# Patient Record
Sex: FEMALE | URBAN - METROPOLITAN AREA
[De-identification: names, ages, dates, MRNs, and addresses within clinical notes are randomized per-mention and may not be internally consistent; named-entity substitution may affect disease eponyms.]

---

## 2018-09-08 ENCOUNTER — RECORDS - HEALTHEAST (OUTPATIENT)
Dept: LAB | Facility: CLINIC | Age: 38
End: 2018-09-08

## 2018-09-10 LAB — N GONORRHOEA DNA SPEC QL NAA+PROBE: NEGATIVE

## 2018-09-12 LAB
GAMMA INTERFERON BACKGROUND BLD IA-ACNC: 0.12 IU/ML
M TB IFN-G BLD-IMP: NEGATIVE
MITOGEN IGNF BCKGRD COR BLD-ACNC: 0.01 IU/ML
MITOGEN IGNF BCKGRD COR BLD-ACNC: 0.01 IU/ML
QTF INTERPRETATION: NORMAL
QTF MITOGEN - NIL: 7.2 IU/ML

## 2019-03-29 ENCOUNTER — RECORDS - HEALTHEAST (OUTPATIENT)
Dept: LAB | Facility: CLINIC | Age: 39
End: 2019-03-29

## 2019-04-01 LAB
GAMMA INTERFERON BACKGROUND BLD IA-ACNC: 0.04 IU/ML
M TB IFN-G BLD-IMP: NEGATIVE
MITOGEN IGNF BCKGRD COR BLD-ACNC: 0.01 IU/ML
MITOGEN IGNF BCKGRD COR BLD-ACNC: 0.05 IU/ML
N GONORRHOEA DNA SPEC QL NAA+PROBE: NEGATIVE
QTF INTERPRETATION: NORMAL
QTF MITOGEN - NIL: 8.44 IU/ML

## 2019-12-11 ENCOUNTER — HOSPITAL ENCOUNTER (EMERGENCY)
Facility: CLINIC | Age: 39
Discharge: HOME | End: 2019-12-11
Attending: EMERGENCY MEDICINE | Admitting: EMERGENCY MEDICINE

## 2019-12-11 ENCOUNTER — APPOINTMENT (OUTPATIENT)
Dept: ULTRASOUND IMAGING | Facility: CLINIC | Age: 39
End: 2019-12-11
Attending: EMERGENCY MEDICINE

## 2019-12-11 VITALS
HEART RATE: 72 BPM | DIASTOLIC BLOOD PRESSURE: 69 MMHG | OXYGEN SATURATION: 100 % | RESPIRATION RATE: 16 BRPM | SYSTOLIC BLOOD PRESSURE: 105 MMHG

## 2019-12-11 DIAGNOSIS — Z32.01 PREGNANCY TEST POSITIVE: ICD-10-CM

## 2019-12-11 DIAGNOSIS — N93.9 VAGINAL BLEEDING: ICD-10-CM

## 2019-12-11 LAB
ABO + RH BLD: NORMAL
ABO + RH BLD: NORMAL
B-HCG SERPL-ACNC: 97 IU/L (ref 0–5)
BLOOD BANK CMNT PATIENT-IMP: NORMAL
BLOOD BANK CMNT PATIENT-IMP: NORMAL
FETAL CELL SCN BLD QL ROSETTE: NORMAL
HGB BLD-MCNC: 10.4 G/DL (ref 11.7–15.7)
RH IG VIALS RECOM PATIENT: NORMAL

## 2019-12-11 PROCEDURE — 76801 OB US < 14 WKS SINGLE FETUS: CPT

## 2019-12-11 PROCEDURE — 86900 BLOOD TYPING SEROLOGIC ABO: CPT | Performed by: EMERGENCY MEDICINE

## 2019-12-11 PROCEDURE — 85461 HEMOGLOBIN FETAL: CPT | Performed by: EMERGENCY MEDICINE

## 2019-12-11 PROCEDURE — 84702 CHORIONIC GONADOTROPIN TEST: CPT | Performed by: EMERGENCY MEDICINE

## 2019-12-11 PROCEDURE — 99284 EMERGENCY DEPT VISIT MOD MDM: CPT | Mod: 25

## 2019-12-11 PROCEDURE — 85018 HEMOGLOBIN: CPT | Performed by: EMERGENCY MEDICINE

## 2019-12-11 PROCEDURE — 86901 BLOOD TYPING SEROLOGIC RH(D): CPT | Performed by: EMERGENCY MEDICINE

## 2019-12-11 ASSESSMENT — ENCOUNTER SYMPTOMS: ABDOMINAL PAIN: 0

## 2019-12-11 NOTE — LETTER
December 11, 2019      To Whom It May Concern:      Claudine Smith was seen in our Emergency Department today, 12/11/19.  I expect her condition to improve over the next 1-2 days.  She may return to work when improved.    Sincerely,        Sugey Alanis RN

## 2019-12-11 NOTE — ED TRIAGE NOTES
Patient was sent here from the clinic.  Patient is pregnant, LMP 10/1, and has been experiencing vaginal bleeding from 12/5 - 12/8.  US done and no visualization of anything in uterus.  Patient sent here to r/o ectopic pregnancy and has a history of ectopic pregnancy.

## 2019-12-11 NOTE — ED AVS SNAPSHOT
[facility] Emergency Department  [address]  [facility] [address]  Phone:  [phone]  Fax:  [phone]                                    Claudine Smith   MRN: [id number]    Department:  [facility] Emergency Department   Date of Visit:  12/11/2019           After Visit Summary Signature Page    I have received my discharge instructions, and my questions have been answered. I have discussed any challenges I see with this plan with the nurse or doctor.    ..........................................................................................................................................  Patient/Patient Representative Signature      ..........................................................................................................................................  Patient Representative Print Name and Relationship to Patient    ..................................................               ................................................  Date                                   Time    ..........................................................................................................................................  Reviewed by Signature/Title    ...................................................              ..............................................  Date                                               Time          22EPIC Rev 08/18

## 2019-12-11 NOTE — ED PROVIDER NOTES
History     Chief Complaint:  Vaginal Bleeding    HPI     A phone  was used obtain the below history as well as to explain diagnosis, plan of treatment, reasons to return to the emergency department and appropriate follow up.    Claudine Smith is a 39 year old  currently pregnant female with a history of ectopic pregnancy who presents with vaginal bleeding. The patient reported that her LMP was in early October which would make the patient approximately 9 weeks pregnant. The patient reported that 7 days ago she had 2 days of heavy vaginal bleeding that she thought might have been her period. Yesterday the patient went to a clinic and took a pregnancy test that was positive. Today she presented to clinic again for ultrasound however they were unable to visualize anything in her uterus so they recommended presentation to the ED for further work up today. The patient's ectopic pregnancy required surgery and the removal of the fallopian tube however she is not sure which one. She denied any abdominal pain, active vaginal bleeding or vaginal discharge.     Allergies:  The patient has no known drug allergies.    Medications:    The patient is currently on no regular medications.     Past Medical History:    The patient denies any significant past medical history.    Past Surgical History:    Fallopian tube removed secondary to to ectopic pregnancy     Family History:    No past pertinent family history.    Social History:  Negative for tobacco use.  Negative for alcohol use.  Negative for drug use.  Marital Status:   [2]     Review of Systems   Gastrointestinal: Negative for abdominal pain.   Genitourinary: Negative for vaginal bleeding and vaginal discharge.   All other systems reviewed and are negative.      Physical Exam     Patient Vitals for the past 24 hrs:   BP Pulse Heart Rate Resp SpO2   19 1730 105/69 72 -- -- 100 %   19 1700 107/74 71 -- -- 100 %   19 1630  109/71 69 -- -- 100 %   19 1616 126/82 -- 89 16 100 %       Physical Exam    Constitutional:  Pleasant, age appropriate.       Resting comfortably in the bed.  HEENT:    Oropharynx is moist  Eyes:    Conjunctiva normal  Neck:    Supple, no meningismus.     CV:     Regular rate and rhythm.      No murmurs, rubs or gallops.     No lower extremity edema.  PULM:    Clear to auscultation bilateral.       No respiratory distress.      Good air exchange.  ABD:    Soft, non-distended.       No abdominal tenderness.     Uterine fundus not palpable     Bowel sounds normal.     No rebound, guarding or rigidity.     No CVA tenderness.   MSK:     No gross deformity to all four extremities.   LYMPH:   No cervical lymphadenopathy.  NEURO:   Alert.  Good muscular tone, no atrophy.   Skin:    Warm, dry and intact.    Psych:    Mood is good and affect is appropriate.      Emergency Department Course   Imaging:  Radiographic findings were communicated with the patient who voiced understanding of the findings.    US OB, <14 weeks w Transvaginal:  No convincing evidence of intrauterine products of  conception. Differential includes spontaneous  and normal  pregnancy with incorrect dates. Ectopic pregnancy is not excluded.  Followup as clinically indicated. No evidence for hemorrhage, as per radiology.     Laboratory:  HC (H)  Hemoglobin: 10.4 (L)  Rh Immune Globin: B Positive      Emergency Department Course:  Nursing notes and vitals reviewed. (1610) I performed an exam of the patient as documented above.     IV inserted. Blood drawn. This was sent to the lab for further testing, results above.    The patient was sent for a OB US while in the emergency department, findings above.     (1831) I rechecked the patient and discussed the results of her workup thus far.     Findings and plan explained to the Patient. Patient discharged home with instructions regarding supportive care, medications, and reasons to return. The  importance of close follow-up was reviewed.     I personally reviewed the laboratory results with the Patient and answered all related questions prior to discharge.       Impression & Plan    Medical Decision Makin-year-old female  at 9 weeks based on LMP presented to the ED with vaginal bleeding last week.  She has no active bleeding at this time.  Despite her dates of 9 weeks, hCG is only 97.  Pelvic ultrasound reveals no evidence of intrauterine or extrauterine pregnancy.  Although she has a history of ectopic pregnancy, collectively her evaluation is most concerning for spontaneous miscarriage.  She is not a RhoGam candidate.  I informed her that I cannot definitively rule out early intrauterine pregnancy or ectopic pregnancy although this appears quite unlikely.  Patient to follow-up with OB/GYN in 2 to 3 days for repeat beta-hCG and return to the ED for any worsening symptoms.    Diagnosis:    ICD-10-CM    1. Vaginal bleeding N93.9    2. Pregnancy test positive Z32.01        Disposition:  discharged to home    Scribe Disclosure:  I, Alanna Montano, am serving as a scribe on 2019 at 4:10 PM to personally document services performed by Naresh Camilo MD based on my observations and the provider's statements to me.     Alanna Montano  2019   [facility] EMERGENCY DEPARTMENT       Naresh Camilo MD  [phone]

## 2019-12-12 NOTE — DISCHARGE INSTRUCTIONS
Please make an appointment to [facility] with [facility] [phone] in 2-3 days even if entirely better.    You will need a repeat hormone level (beta hCG) in 2 to 3 days by the OB/GYN doctor.

## 2020-11-22 ENCOUNTER — HEALTH MAINTENANCE LETTER (OUTPATIENT)
Age: 40
End: 2020-11-22

## 2021-09-19 ENCOUNTER — HEALTH MAINTENANCE LETTER (OUTPATIENT)
Age: 41
End: 2021-09-19

## 2022-01-09 ENCOUNTER — HEALTH MAINTENANCE LETTER (OUTPATIENT)
Age: 42
End: 2022-01-09

## 2022-11-21 ENCOUNTER — HEALTH MAINTENANCE LETTER (OUTPATIENT)
Age: 42
End: 2022-11-21

## 2023-04-16 ENCOUNTER — HEALTH MAINTENANCE LETTER (OUTPATIENT)
Age: 43
End: 2023-04-16

## 2024-02-03 ENCOUNTER — HEALTH MAINTENANCE LETTER (OUTPATIENT)
Age: 44
End: 2024-02-03